# Patient Record
(demographics unavailable — no encounter records)

---

## 2024-11-26 NOTE — HISTORY OF PRESENT ILLNESS
[FreeTextEntry1] : The patient is here for a routine gynecological examination with Pap smear.  Her LMP was over ten years   No PMB    She has no recent gynecological or urinary problems

## 2024-11-26 NOTE — PHYSICAL EXAM
[Appropriately responsive] : appropriately responsive [Alert] : alert [No Acute Distress] : no acute distress [No Lymphadenopathy] : no lymphadenopathy [Regular Rate Rhythm] : regular rate rhythm [No Murmurs] : no murmurs [Clear to Auscultation B/L] : clear to auscultation bilaterally [Soft] : soft [Non-tender] : non-tender [Non-distended] : non-distended [No HSM] : No HSM [No Lesions] : no lesions [No Mass] : no mass [Oriented x3] : oriented x3 [Examination Of The Breasts] : a normal appearance [No Masses] : no breast masses were palpable [Vulvar Atrophy] : vulvar atrophy [Labia Majora] : normal [Labia Minora] : normal [Atrophy] : atrophy [Cystocele] : a cystocele [Uterine Prolapse] : uterine prolapse [Normal] : normal [Uterine Adnexae] : normal

## 2024-11-26 NOTE — DISCUSSION/SUMMARY
[FreeTextEntry1] : General measures for good overall health are discussed with patient.   She is advised to follow three pathways to assure the best chances for overall health for now and the future.   First:  She must make sure that any current condition is treated promptly and properly.  If she has chronic issues such as hypothyroid, hypertension, diabetes, hyperlipidemia, etc. she should take the prescribed medications carefully and follow any other recommendations of the physician treating the problem.  If there are new issues, such as UTI or other infections or injuries or new disease, treatment should be without delay and completed as per physician instructions.  Treating current illness properly reduces the harm to health  Second:  If there are diseases that cannot be prevented but can be diagnosed early for effective treatment any testing schedules for these diseases should be followed.  This would include mammogram for breast cancer, Pap smear for cervical cancer,, body skin evaluations for skin cancer, eye exams for glaucoma and other eye conditions, colonoscopy for colon cancer, bone density testing for osteoporosis transvaginal sonogram for uterine and ovarian disease and others.  It is emphasized that although prevention may not be possible, many of these conditions can be effectively treated and/or cured with early diagnosis.  Mammogram normal yesterday  Finally, Healthy Lifestyle is important to prevent or reduce the risk of disease.  Healthy lifestyle includes avoiding habits that cause disease such and tobacco, drugs, and alcoholism.   Positive lifestyle habits include exercise and activity which can be in or out of a gym.  There is almost no limit to daily activity, and men and women who maintain an active lifestyle live longer and healthier, feel better, and look better.  Nutrition is very important and is like fuel for a machine.  Good fuel helps a machine run better, and healthy diet makes the body run better.  The pyramid of foods is a good guide to healthy eating.  Rest at night, with or without sleep, is important and one should be in bed for at least 7 hours per night.   Teeth and gums much be cared for both at the Dentist office and at home.  All questions are answered about preventing and treating disease